# Patient Record
Sex: MALE | Race: WHITE | Employment: FULL TIME
[De-identification: names, ages, dates, MRNs, and addresses within clinical notes are randomized per-mention and may not be internally consistent; named-entity substitution may affect disease eponyms.]

---

## 2022-06-23 ENCOUNTER — NURSE TRIAGE (OUTPATIENT)
Dept: OTHER | Facility: CLINIC | Age: 47
End: 2022-06-23

## 2022-06-23 NOTE — TELEPHONE ENCOUNTER
Received call from Carolyn at [facility]; Patient with Red Flag Complaint requesting to establish care with [facility]. Subjective: Caller states \"Having abdominal pain on the left side under ribs that started 1-2 months ago. It is hard to lay on that side. For the past couple days it started to get worse and I feel full and bloated\"     Current Symptoms: Abdominal Pain, bloated, burping     Onset: 1 -2 months ago; worsening    Associated Symptoms: NA    Pain Severity: 5-6/10; throbbing; intermittent    Temperature:  Denies Fever    What has been tried: Tylenol     LMP: NA Pregnant: NA    Recommended disposition: See in Office Within 2 Weeks    Care advice provided, patient verbalizes understanding; denies any other questions or concerns; instructed to call back for any new or worsening symptoms. Patient/Caller agrees with recommended disposition; writer provided warm transfer to Oliver Cornell at [facility] for appointment scheduling    Attention Provider: Thank you for allowing me to participate in the care of your patient. The patient was connected to triage in response to information provided to the [facility]. Please do not respond through this encounter as the response is not directed to a shared pool.         Reason for Disposition   Pain is mainly in upper abdomen (if needed ask: 'is it mainly above the belly button?')   Abdominal pain is a chronic symptom (recurrent or ongoing AND lasting > 4 weeks)    Protocols used: ABDOMINAL PAIN - MALE-ADULT-OH, ABDOMINAL PAIN - UPPER-ADULT-OH

## 2022-06-24 ENCOUNTER — OFFICE VISIT (OUTPATIENT)
Dept: FAMILY MEDICINE CLINIC | Age: 47
End: 2022-06-24

## 2022-06-24 ENCOUNTER — HOSPITAL ENCOUNTER (OUTPATIENT)
Age: 47
Setting detail: SPECIMEN
Discharge: HOME | End: 2022-06-24

## 2022-06-24 VITALS
SYSTOLIC BLOOD PRESSURE: 144 MMHG | HEART RATE: 90 BPM | BODY MASS INDEX: 34.25 KG/M2 | OXYGEN SATURATION: 98 % | HEIGHT: 68 IN | TEMPERATURE: 98 F | DIASTOLIC BLOOD PRESSURE: 88 MMHG | WEIGHT: 226 LBS

## 2022-06-24 DIAGNOSIS — R10.12 LEFT UPPER QUADRANT ABDOMINAL PAIN: ICD-10-CM

## 2022-06-24 DIAGNOSIS — R10.13 EPIGASTRIC ABDOMINAL PAIN: ICD-10-CM

## 2022-06-24 DIAGNOSIS — R10.9 FLANK PAIN: ICD-10-CM

## 2022-06-24 DIAGNOSIS — R10.9 FLANK PAIN: Primary | ICD-10-CM

## 2022-06-24 LAB
ALBUMIN SERPL-MCNC: 5.1 G/DL (ref 3.5–4.6)
ALP BLD-CCNC: 53 U/L (ref 35–104)
ALT SERPL-CCNC: 51 U/L (ref 0–41)
ANION GAP SERPL CALCULATED.3IONS-SCNC: 15 MEQ/L (ref 9–15)
AST SERPL-CCNC: 27 U/L (ref 0–40)
BASOPHILS ABSOLUTE: 0.1 K/UL (ref 0–0.2)
BASOPHILS RELATIVE PERCENT: 0.7 %
BILIRUB SERPL-MCNC: 0.3 MG/DL (ref 0.2–0.7)
BUN BLDV-MCNC: 13 MG/DL (ref 6–20)
CALCIUM SERPL-MCNC: 9.8 MG/DL (ref 8.5–9.9)
CHLORIDE BLD-SCNC: 99 MEQ/L (ref 95–107)
CO2: 26 MEQ/L (ref 20–31)
CREAT SERPL-MCNC: 0.94 MG/DL (ref 0.7–1.2)
EOSINOPHILS ABSOLUTE: 0 K/UL (ref 0–0.7)
EOSINOPHILS RELATIVE PERCENT: 0.5 %
GFR AFRICAN AMERICAN: >60
GFR NON-AFRICAN AMERICAN: >60
GLOBULIN: 2.5 G/DL (ref 2.3–3.5)
GLUCOSE BLD-MCNC: 76 MG/DL (ref 70–99)
HCT VFR BLD CALC: 42.4 % (ref 42–52)
HEMOGLOBIN: 14.3 G/DL (ref 14–18)
LIPASE: 83 U/L (ref 12–95)
LYMPHOCYTES ABSOLUTE: 2 K/UL (ref 1–4.8)
LYMPHOCYTES RELATIVE PERCENT: 22.2 %
MCH RBC QN AUTO: 30 PG (ref 27–31.3)
MCHC RBC AUTO-ENTMCNC: 33.7 % (ref 33–37)
MCV RBC AUTO: 89.2 FL (ref 80–100)
MONOCYTES ABSOLUTE: 0.7 K/UL (ref 0.2–0.8)
MONOCYTES RELATIVE PERCENT: 8.2 %
NEUTROPHILS ABSOLUTE: 6 K/UL (ref 1.4–6.5)
NEUTROPHILS RELATIVE PERCENT: 68.4 %
PDW BLD-RTO: 12.9 % (ref 11.5–14.5)
PLATELET # BLD: 266 K/UL (ref 130–400)
POTASSIUM SERPL-SCNC: 4.3 MEQ/L (ref 3.4–4.9)
RBC # BLD: 4.75 M/UL (ref 4.7–6.1)
SODIUM BLD-SCNC: 140 MEQ/L (ref 135–144)
TOTAL PROTEIN: 7.6 G/DL (ref 6.3–8)
WBC # BLD: 8.8 K/UL (ref 4.8–10.8)

## 2022-06-24 PROCEDURE — 99214 OFFICE O/P EST MOD 30 MIN: CPT | Performed by: NURSE PRACTITIONER

## 2022-06-24 PROCEDURE — 81002 URINALYSIS NONAUTO W/O SCOPE: CPT | Performed by: NURSE PRACTITIONER

## 2022-06-24 PROCEDURE — 85025 COMPLETE CBC W/AUTO DIFF WBC: CPT

## 2022-06-24 PROCEDURE — 80053 COMPREHEN METABOLIC PANEL: CPT

## 2022-06-24 PROCEDURE — 83690 ASSAY OF LIPASE: CPT

## 2022-06-24 RX ORDER — ACETAMINOPHEN 325 MG/1
650 TABLET ORAL EVERY 6 HOURS PRN
COMMUNITY

## 2022-06-24 RX ORDER — ASPIRIN 325 MG
325 TABLET ORAL DAILY
COMMUNITY

## 2022-06-24 SDOH — ECONOMIC STABILITY: FOOD INSECURITY: WITHIN THE PAST 12 MONTHS, THE FOOD YOU BOUGHT JUST DIDN'T LAST AND YOU DIDN'T HAVE MONEY TO GET MORE.: NEVER TRUE

## 2022-06-24 SDOH — ECONOMIC STABILITY: FOOD INSECURITY: WITHIN THE PAST 12 MONTHS, YOU WORRIED THAT YOUR FOOD WOULD RUN OUT BEFORE YOU GOT MONEY TO BUY MORE.: NEVER TRUE

## 2022-06-24 ASSESSMENT — PATIENT HEALTH QUESTIONNAIRE - PHQ9
SUM OF ALL RESPONSES TO PHQ QUESTIONS 1-9: 0
2. FEELING DOWN, DEPRESSED OR HOPELESS: 0
SUM OF ALL RESPONSES TO PHQ9 QUESTIONS 1 & 2: 0
1. LITTLE INTEREST OR PLEASURE IN DOING THINGS: 0
SUM OF ALL RESPONSES TO PHQ QUESTIONS 1-9: 0

## 2022-06-24 ASSESSMENT — SOCIAL DETERMINANTS OF HEALTH (SDOH): HOW HARD IS IT FOR YOU TO PAY FOR THE VERY BASICS LIKE FOOD, HOUSING, MEDICAL CARE, AND HEATING?: NOT HARD AT ALL

## 2022-06-24 ASSESSMENT — ENCOUNTER SYMPTOMS
BOWEL INCONTINENCE: 0
ABDOMINAL PAIN: 1
SHORTNESS OF BREATH: 0

## 2022-06-24 NOTE — PROGRESS NOTES
[address] [facility]  [address] [place] [id number]  Dept: [phone]  Dept Fax: [phone]  Loc: [phone]     Eloisa Kinsey 51 y.o. male presents 6/24/22 with   Chief Complaint   Patient presents with   [facility]    Flank Pain     x 1 month    GI Problem     x 2 to 3 days, burping alot, diarrhea        Flank Pain  This is a new problem. The current episode started 1 to 4 weeks ago. The problem has been waxing and waning since onset. The quality of the pain is described as cramping. The symptoms are aggravated by lying down. Associated symptoms include abdominal pain. Pertinent negatives include no bladder incontinence, bowel incontinence, chest pain, dysuria, fever, numbness, paresthesias or perianal numbness. Treatments tried: tried pepto bismol. The treatment provided no relief. GI Problem  The primary symptoms include abdominal pain, nausea, vomiting and diarrhea. Primary symptoms do not include fever or dysuria. The illness does not include chills. Patient presents for lateral abdominal/flank pain, near left ribs. Pain comes and goes. Started out of nowhere around one month ago. Some pain in the epigastric area also, also having some belching and gas. Vomited small amount two days ago. Diarrhea past couple days, 3 times today, was watery. No blood in stool or vomit. Having a lot of burping. Denies heartburn. No fever/chills. States he urinates a lot at night but not during the day, denies urgency.     Reviewed the following history:    Past Medical History:   Diagnosis Date    Asthma      Past Surgical History:   Procedure Laterality Date    FACIAL COSMETIC SURGERY      LEG SURGERY      PELVIC FRACTURE SURGERY       Family History   Problem Relation Age of Onset    Cancer Mother     Diabetes Mother     Heart Disease Mother     Heart Disease Father     High Blood Pressure Father     Cancer Brother        No Known Allergies    Current Outpatient Medications on File Prior to Visit   Medication Sig Dispense Refill    acetaminophen (TYLENOL) 325 MG tablet Take 650 mg by mouth every 6 hours as needed for Pain      aspirin 325 MG tablet Take 325 mg by mouth daily       No current facility-administered medications on file prior to visit. Review of Systems   Constitutional: Negative for chills and fever. Respiratory: Negative for shortness of breath. Cardiovascular: Negative for chest pain. Gastrointestinal: Positive for abdominal pain, diarrhea, nausea and vomiting. Negative for blood in stool and bowel incontinence. Genitourinary: Positive for flank pain and frequency. Negative for bladder incontinence, dysuria, genital sores, hematuria and urgency. Neurological: Negative for numbness and paresthesias. Objective    Vitals:    06/24/22 1549   BP: (!) 144/88   Site: Right Upper Arm   Position: Sitting   Cuff Size: Medium Adult   Pulse: 90   Temp: 98 °F (36.7 °C)   TempSrc: Infrared   SpO2: 98%   Weight: 226 lb (102.5 kg)   Height: 5' 7.75\" (1.721 m)       Physical Exam  Constitutional:       General: He is not in acute distress. Appearance: Normal appearance. He is not ill-appearing or toxic-appearing. HENT:      Head: Normocephalic and atraumatic. Right Ear: Hearing and external ear normal.      Left Ear: Hearing and external ear normal.   Eyes:      General: Lids are normal.      Conjunctiva/sclera: Conjunctivae normal.   Cardiovascular:      Rate and Rhythm: Normal rate and regular rhythm. Heart sounds: Normal heart sounds. Pulmonary:      Effort: Pulmonary effort is normal. No respiratory distress. Breath sounds: Normal breath sounds. No decreased breath sounds, wheezing, rhonchi or rales. Abdominal:      General: Bowel sounds are normal.      Palpations: Abdomen is soft. Tenderness:  There is abdominal tenderness in the epigastric area and left upper quadrant. Musculoskeletal:      Cervical back: Normal range of motion and neck supple. Skin:     General: Skin is warm and dry. Neurological:      General: No focal deficit present. Mental Status: He is alert and oriented to person, place, and time. Psychiatric:         Attention and Perception: Attention normal.         Mood and Affect: Mood normal.         Speech: Speech normal.         Behavior: Behavior normal.         Thought Content: Thought content normal.         Cognition and Memory: Cognition normal.         Judgment: Judgment normal.       POC Testing Today:   Results for POC orders placed in visit on 06/24/22   POCT Urinalysis no Micro   Result Value Ref Range    Color, UA yellow     Clarity, UA clear     Glucose, UA POC -     Bilirubin, UA -     Ketones, UA -     Spec Grav, UA 1.020     Blood, UA POC -     pH, UA 6.0     Protein, UA POC -     Urobilinogen, UA -     Leukocytes, UA -     Nitrite, UA -        Assessment and Plan    Seamus Zurita 51 y.o. male presenting for flank pain     1. Flank pain  - POCT Urinalysis no Micro  - Comprehensive Metabolic Panel; Future  - CBC with Auto Differential; Future  - Lipase; Future  - XR CHEST STANDARD (2 VW); Future  - US ABDOMEN COMPLETE; Future    2. Epigastric abdominal pain  - Comprehensive Metabolic Panel; Future  - CBC with Auto Differential; Future  - Lipase; Future  - US ABDOMEN COMPLETE; Future    3. Left upper quadrant abdominal pain  - Comprehensive Metabolic Panel; Future  - CBC with Auto Differential; Future  - Lipase; Future  - US ABDOMEN COMPLETE; Future      Procedures:  Unless otherwise noted below, none    Return if symptoms worsen or fail to improve, for follow up. Side effects and adverse effects of any medication prescribed today, as well as treatment plan/rationale, follow-up care, and result expectations have been discussed with the patient.   Expresses understanding and desires to proceed with treatment plan. The patient was reminded that if an antibiotic has been prescribed the predicted course is improvement to cure with no persistent issues. Take antibiotics as directed. If any problems occur, an appointment should be made or ER visit if severe. Because of the risk with ANY antibiotic of C. Difficile colitis if persistent diarrhea or abdominal pain or any concerning symptoms, we should be notified. To reduce this risk, a probiotic pill, yogurt or other preparations containing active cultures should be ingested daily -particularly while on the antibiotic. If any persistent symptoms of illness, follow up appointment should be made in a timely fashion with a physician. Discussed signs and symptoms which require immediate follow-up in ED/call to 911. Understanding verbalized. I have reviewed and updated the electronic medical record.     HENRY Tolliver - CNP

## 2022-06-27 ENCOUNTER — TELEPHONE (OUTPATIENT)
Dept: FAMILY MEDICINE CLINIC | Age: 47
End: 2022-06-27

## 2022-06-27 LAB
BILIRUBIN, POC: NORMAL
BLOOD URINE, POC: NORMAL
CLARITY, POC: CLEAR
COLOR, POC: YELLOW
GLUCOSE URINE, POC: NORMAL
KETONES, POC: NORMAL
LEUKOCYTE EST, POC: NORMAL
NITRITE, POC: NORMAL
PH, POC: 6
PROTEIN, POC: NORMAL
SPECIFIC GRAVITY, POC: 1.02
UROBILINOGEN, POC: NORMAL

## 2022-07-06 ASSESSMENT — ENCOUNTER SYMPTOMS
BLOOD IN STOOL: 0
NAUSEA: 1
VOMITING: 1
DIARRHEA: 1

## 2024-08-06 ENCOUNTER — APPOINTMENT (OUTPATIENT)
Dept: PRIMARY CARE | Facility: CLINIC | Age: 49
End: 2024-08-06

## 2024-08-06 VITALS
DIASTOLIC BLOOD PRESSURE: 80 MMHG | SYSTOLIC BLOOD PRESSURE: 140 MMHG | OXYGEN SATURATION: 98 % | WEIGHT: 228.6 LBS | HEIGHT: 68 IN | BODY MASS INDEX: 34.65 KG/M2 | TEMPERATURE: 97.1 F | HEART RATE: 84 BPM

## 2024-08-06 DIAGNOSIS — E78.2 MIXED HYPERLIPIDEMIA: ICD-10-CM

## 2024-08-06 DIAGNOSIS — Z12.11 SCREENING FOR COLON CANCER: ICD-10-CM

## 2024-08-06 DIAGNOSIS — N52.8 OTHER MALE ERECTILE DYSFUNCTION: ICD-10-CM

## 2024-08-06 DIAGNOSIS — R52 PAIN: ICD-10-CM

## 2024-08-06 DIAGNOSIS — N40.0 BENIGN PROSTATIC HYPERPLASIA WITHOUT LOWER URINARY TRACT SYMPTOMS: ICD-10-CM

## 2024-08-06 DIAGNOSIS — J45.20 MILD INTERMITTENT ASTHMA WITHOUT COMPLICATION (HHS-HCC): ICD-10-CM

## 2024-08-06 DIAGNOSIS — Z76.89 ENCOUNTER TO ESTABLISH CARE: Primary | ICD-10-CM

## 2024-08-06 DIAGNOSIS — N40.0 BENIGN PROSTATIC HYPERPLASIA, UNSPECIFIED WHETHER LOWER URINARY TRACT SYMPTOMS PRESENT: ICD-10-CM

## 2024-08-06 DIAGNOSIS — E66.09 CLASS 1 OBESITY DUE TO EXCESS CALORIES WITH SERIOUS COMORBIDITY AND BODY MASS INDEX (BMI) OF 34.0 TO 34.9 IN ADULT: ICD-10-CM

## 2024-08-06 DIAGNOSIS — Z00.00 HEALTHCARE MAINTENANCE: ICD-10-CM

## 2024-08-06 PROCEDURE — 99203 OFFICE O/P NEW LOW 30 MIN: CPT | Performed by: FAMILY MEDICINE

## 2024-08-06 PROCEDURE — 3008F BODY MASS INDEX DOCD: CPT | Performed by: FAMILY MEDICINE

## 2024-08-06 RX ORDER — SILDENAFIL 100 MG/1
100 TABLET, FILM COATED ORAL DAILY PRN
Qty: 30 TABLET | Refills: 3 | Status: SHIPPED | OUTPATIENT
Start: 2024-08-06

## 2024-08-06 RX ORDER — GABAPENTIN 300 MG/1
300 CAPSULE ORAL 2 TIMES DAILY
Qty: 60 CAPSULE | Refills: 0 | Status: SHIPPED | OUTPATIENT
Start: 2024-08-06 | End: 2024-09-05

## 2024-08-06 RX ORDER — PREDNISONE 10 MG/1
TABLET ORAL
Qty: 51 TABLET | Refills: 0 | Status: SHIPPED | OUTPATIENT
Start: 2024-08-06

## 2024-08-06 ASSESSMENT — PATIENT HEALTH QUESTIONNAIRE - PHQ9
2. FEELING DOWN, DEPRESSED OR HOPELESS: NOT AT ALL
SUM OF ALL RESPONSES TO PHQ9 QUESTIONS 1 AND 2: 0
1. LITTLE INTEREST OR PLEASURE IN DOING THINGS: NOT AT ALL
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
## 2024-08-19 ENCOUNTER — LAB (OUTPATIENT)
Dept: LAB | Facility: LAB | Age: 49
End: 2024-08-19
Payer: COMMERCIAL

## 2024-08-19 DIAGNOSIS — N52.8 OTHER MALE ERECTILE DYSFUNCTION: ICD-10-CM

## 2024-08-19 DIAGNOSIS — Z00.00 HEALTHCARE MAINTENANCE: ICD-10-CM

## 2024-08-19 DIAGNOSIS — N40.0 BENIGN PROSTATIC HYPERPLASIA WITHOUT LOWER URINARY TRACT SYMPTOMS: ICD-10-CM

## 2024-08-19 DIAGNOSIS — E78.2 MIXED HYPERLIPIDEMIA: ICD-10-CM

## 2024-08-19 LAB
ALBUMIN SERPL BCP-MCNC: 4.4 G/DL (ref 3.4–5)
ALP SERPL-CCNC: 47 U/L (ref 33–120)
ALT SERPL W P-5'-P-CCNC: 27 U/L (ref 10–52)
ANION GAP SERPL CALC-SCNC: 11 MMOL/L (ref 10–20)
AST SERPL W P-5'-P-CCNC: 13 U/L (ref 9–39)
BASOPHILS # BLD AUTO: 0.02 X10*3/UL (ref 0–0.1)
BASOPHILS NFR BLD AUTO: 0.2 %
BILIRUB SERPL-MCNC: 0.5 MG/DL (ref 0–1.2)
BUN SERPL-MCNC: 13 MG/DL (ref 6–23)
CALCIUM SERPL-MCNC: 9.2 MG/DL (ref 8.6–10.3)
CHLORIDE SERPL-SCNC: 102 MMOL/L (ref 98–107)
CHOLEST SERPL-MCNC: 242 MG/DL (ref 0–199)
CHOLESTEROL/HDL RATIO: 4.7
CO2 SERPL-SCNC: 30 MMOL/L (ref 21–32)
CREAT SERPL-MCNC: 0.75 MG/DL (ref 0.5–1.3)
EGFRCR SERPLBLD CKD-EPI 2021: >90 ML/MIN/1.73M*2
EOSINOPHIL # BLD AUTO: 0.15 X10*3/UL (ref 0–0.7)
EOSINOPHIL NFR BLD AUTO: 1.2 %
ERYTHROCYTE [DISTWIDTH] IN BLOOD BY AUTOMATED COUNT: 12.3 % (ref 11.5–14.5)
GLUCOSE SERPL-MCNC: 89 MG/DL (ref 74–99)
HCT VFR BLD AUTO: 45.7 % (ref 41–52)
HDLC SERPL-MCNC: 51.3 MG/DL
HGB BLD-MCNC: 15.2 G/DL (ref 13.5–17.5)
IMM GRANULOCYTES # BLD AUTO: 0.09 X10*3/UL (ref 0–0.7)
IMM GRANULOCYTES NFR BLD AUTO: 0.7 % (ref 0–0.9)
LDLC SERPL CALC-MCNC: 140 MG/DL
LYMPHOCYTES # BLD AUTO: 1.89 X10*3/UL (ref 1.2–4.8)
LYMPHOCYTES NFR BLD AUTO: 15.2 %
MCH RBC QN AUTO: 30.3 PG (ref 26–34)
MCHC RBC AUTO-ENTMCNC: 33.3 G/DL (ref 32–36)
MCV RBC AUTO: 91 FL (ref 80–100)
MONOCYTES # BLD AUTO: 0.75 X10*3/UL (ref 0.1–1)
MONOCYTES NFR BLD AUTO: 6 %
NEUTROPHILS # BLD AUTO: 9.51 X10*3/UL (ref 1.2–7.7)
NEUTROPHILS NFR BLD AUTO: 76.7 %
NON HDL CHOLESTEROL: 191 MG/DL (ref 0–149)
NRBC BLD-RTO: 0 /100 WBCS (ref 0–0)
PLATELET # BLD AUTO: 248 X10*3/UL (ref 150–450)
POTASSIUM SERPL-SCNC: 5.2 MMOL/L (ref 3.5–5.3)
PROT SERPL-MCNC: 6.9 G/DL (ref 6.4–8.2)
PSA SERPL-MCNC: 0.91 NG/ML
RBC # BLD AUTO: 5.02 X10*6/UL (ref 4.5–5.9)
SODIUM SERPL-SCNC: 138 MMOL/L (ref 136–145)
TRIGL SERPL-MCNC: 253 MG/DL (ref 0–149)
VLDL: 51 MG/DL (ref 0–40)
WBC # BLD AUTO: 12.4 X10*3/UL (ref 4.4–11.3)

## 2024-08-19 PROCEDURE — 80053 COMPREHEN METABOLIC PANEL: CPT

## 2024-08-19 PROCEDURE — 85025 COMPLETE CBC W/AUTO DIFF WBC: CPT

## 2024-08-19 PROCEDURE — 36415 COLL VENOUS BLD VENIPUNCTURE: CPT

## 2024-08-19 PROCEDURE — 84403 ASSAY OF TOTAL TESTOSTERONE: CPT

## 2024-08-19 PROCEDURE — 84153 ASSAY OF PSA TOTAL: CPT

## 2024-08-19 PROCEDURE — 80061 LIPID PANEL: CPT

## 2024-08-20 DIAGNOSIS — E78.2 MIXED HYPERLIPIDEMIA: ICD-10-CM

## 2024-08-20 LAB — TESTOST SERPL-MCNC: 287 NG/DL (ref 240–1000)

## 2024-08-20 RX ORDER — ROSUVASTATIN CALCIUM 20 MG/1
20 TABLET, COATED ORAL DAILY
Qty: 30 TABLET | Refills: 3 | Status: SHIPPED | OUTPATIENT
Start: 2024-08-20 | End: 2025-09-24

## 2024-08-20 NOTE — TELEPHONE ENCOUNTER
----- Message from Sunil Garner sent at 8/20/2024  7:39 AM EDT -----  Labs are normal except for elevated cholesterol persistent.  Recommend he start on 20 mg of generic Crestor and check a fasting lipid panel in 4 months

## 2024-08-22 ENCOUNTER — APPOINTMENT (OUTPATIENT)
Dept: PRIMARY CARE | Facility: CLINIC | Age: 49
End: 2024-08-22
Payer: COMMERCIAL

## 2024-08-22 VITALS
HEART RATE: 91 BPM | SYSTOLIC BLOOD PRESSURE: 132 MMHG | TEMPERATURE: 97.3 F | DIASTOLIC BLOOD PRESSURE: 80 MMHG | OXYGEN SATURATION: 97 % | HEIGHT: 68 IN | RESPIRATION RATE: 16 BRPM | BODY MASS INDEX: 35.37 KG/M2 | WEIGHT: 233.4 LBS

## 2024-08-22 DIAGNOSIS — N52.8 OTHER MALE ERECTILE DYSFUNCTION: ICD-10-CM

## 2024-08-22 DIAGNOSIS — M54.40 ACUTE RIGHT-SIDED LOW BACK PAIN WITH SCIATICA, SCIATICA LATERALITY UNSPECIFIED: Primary | ICD-10-CM

## 2024-08-22 DIAGNOSIS — E66.9 OBESITY (BMI 35.0-39.9 WITHOUT COMORBIDITY): ICD-10-CM

## 2024-08-22 DIAGNOSIS — R52 PAIN: ICD-10-CM

## 2024-08-22 PROBLEM — R53.83 OTHER FATIGUE: Status: ACTIVE | Noted: 2019-01-15

## 2024-08-22 PROCEDURE — 3008F BODY MASS INDEX DOCD: CPT | Performed by: FAMILY MEDICINE

## 2024-08-22 PROCEDURE — 99213 OFFICE O/P EST LOW 20 MIN: CPT | Performed by: FAMILY MEDICINE

## 2024-08-22 RX ORDER — TADALAFIL 20 MG/1
20 TABLET ORAL DAILY PRN
Qty: 12 TABLET | Refills: 3 | Status: SHIPPED | OUTPATIENT
Start: 2024-08-22 | End: 2025-08-22

## 2024-08-22 RX ORDER — PREDNISONE 10 MG/1
TABLET ORAL
Qty: 51 TABLET | Refills: 0 | Status: SHIPPED | OUTPATIENT
Start: 2024-08-22

## 2024-08-22 RX ORDER — CYCLOBENZAPRINE HCL 5 MG
5 TABLET ORAL 3 TIMES DAILY PRN
Qty: 30 TABLET | Refills: 2 | Status: SHIPPED | OUTPATIENT
Start: 2024-08-22 | End: 2025-04-19

## 2024-08-22 RX ORDER — OXYCODONE AND ACETAMINOPHEN 5; 325 MG/1; MG/1
1 TABLET ORAL EVERY 8 HOURS PRN
Qty: 21 TABLET | Refills: 0 | Status: SHIPPED | OUTPATIENT
Start: 2024-08-22 | End: 2024-08-29

## 2024-08-22 NOTE — PROGRESS NOTES
Subjective   Patient ID: Yan Monroy is a 49 y.o. male who presents for No chief complaint on file..  HPI  pt is being seen for lower back pain and right hip  ongoing for 1 week  Pt states that his back give out while in bed  Pt reate pain at 8/10   pt is taking gabapentin not work    No other concern /question     Review of Systems  Constitutional:  no chills, no fever and no night sweats.  Eyes: no blurred vision and no eyesight problems.  ENT: no hearing loss, no nasal congestion, no hoarseness and no sore throat.  Neck: no mass (es) and no swelling.  Cardiovascular: no chest pain, no intermittent leg claudication, no lower extremity edema, no palpitation and no syncope.  Respiratory: no cough, no shortness of breath during exertion, no shortness of breath at rest and no wheezing.  Gastrointestinal: no abdominal pain, no blood in stools, no constipation, no diarrhea, no melena, no nausea, no rectal pain and no vomiting.  Genitourinary: no dysuria, no change in urinary frequency, no urinary hesitancy and no feelings of urinary urgency.  Musculoskeletal: no arthralgias, no back pain and no myalgias.  Integumentary: no new skin lesions and no rashes.  Neurological: no difficulty walking, no headache, no limb weakness, no numbness and no tingling.  Psychiatric/Behavioral: no anxiety, no depression, no anhedonia and no substance use disorders.  Endocrine: no recent weight gain and no recent weight loss.  Hematologic/Lymphatic: no tendency for easy bruising and no swollen glands    Objective   Physical Exam  Patient intermittent chronic back pain.  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  Exam he is pain and tenderness in the lumbar spine over  the L3-L4 vertebral body with radiation of pain  There were no vitals taken for this visit.  Lumbar musculature on the right side some pain down into the right buttock and posterior thigh he has pain in the back with extension of the right leg.  But no paresthesias no pain with raising of the left leg    Lab Results   Component Value Date    WBC 12.4 (H) 08/19/2024    HGB 15.2 08/19/2024    HCT 45.7 08/19/2024    MCV 91 08/19/2024     08/19/2024       Assessment/Plan assessments acute back injury muscle spasm strain plan burst and taper prednisone Flexeril 5 3 times daily and oxycodone 5 mg every 8 hours as needed pain he will do gentle stretches heat ice once he is feeling better we will get him into do physical therapy to work on strengthening to try to minimize exacerbations.  If he is worsening or new symptoms develop over the weekend he is to go to the emergency room  Problem List Items Addressed This Visit    None

## 2024-08-27 ENCOUNTER — ANESTHESIA EVENT (OUTPATIENT)
Dept: GASTROENTEROLOGY | Facility: EXTERNAL LOCATION | Age: 49
End: 2024-08-27

## 2024-09-09 ENCOUNTER — APPOINTMENT (OUTPATIENT)
Dept: GASTROENTEROLOGY | Facility: EXTERNAL LOCATION | Age: 49
End: 2024-09-09
Payer: COMMERCIAL

## 2024-09-09 ENCOUNTER — ANESTHESIA (OUTPATIENT)
Dept: GASTROENTEROLOGY | Facility: EXTERNAL LOCATION | Age: 49
End: 2024-09-09

## 2024-09-09 VITALS
WEIGHT: 225 LBS | SYSTOLIC BLOOD PRESSURE: 127 MMHG | OXYGEN SATURATION: 95 % | RESPIRATION RATE: 15 BRPM | HEART RATE: 95 BPM | DIASTOLIC BLOOD PRESSURE: 65 MMHG | BODY MASS INDEX: 34.1 KG/M2 | HEIGHT: 68 IN | TEMPERATURE: 97.7 F

## 2024-09-09 DIAGNOSIS — Z12.11 SCREENING FOR COLON CANCER: Primary | ICD-10-CM

## 2024-09-09 PROBLEM — G89.29 CHRONIC PAIN: Status: ACTIVE | Noted: 2024-09-09

## 2024-09-09 PROBLEM — F12.90 MARIJUANA SMOKER: Status: ACTIVE | Noted: 2024-09-09

## 2024-09-09 PROBLEM — E78.5 HYPERLIPIDEMIA: Status: ACTIVE | Noted: 2024-09-09

## 2024-09-09 PROCEDURE — 0753T DGTZ GLS MCRSCP SLD LEVEL IV: CPT | Mod: TC,ELYLAB | Performed by: INTERNAL MEDICINE

## 2024-09-09 PROCEDURE — 88305 TISSUE EXAM BY PATHOLOGIST: CPT | Performed by: PATHOLOGY

## 2024-09-09 PROCEDURE — 45385 COLONOSCOPY W/LESION REMOVAL: CPT | Performed by: INTERNAL MEDICINE

## 2024-09-09 PROCEDURE — 45380 COLONOSCOPY AND BIOPSY: CPT | Performed by: INTERNAL MEDICINE

## 2024-09-09 RX ORDER — OXYCODONE AND ACETAMINOPHEN 5; 325 MG/1; MG/1
1 TABLET ORAL EVERY 4 HOURS PRN
COMMUNITY

## 2024-09-09 RX ORDER — PROPOFOL 10 MG/ML
INJECTION, EMULSION INTRAVENOUS AS NEEDED
Status: DISCONTINUED | OUTPATIENT
Start: 2024-09-09 | End: 2024-09-09

## 2024-09-09 RX ORDER — SODIUM CHLORIDE 9 MG/ML
20 INJECTION, SOLUTION INTRAVENOUS CONTINUOUS
Status: DISCONTINUED | OUTPATIENT
Start: 2024-09-09 | End: 2024-09-10 | Stop reason: HOSPADM

## 2024-09-09 RX ORDER — LIDOCAINE HYDROCHLORIDE 20 MG/ML
INJECTION, SOLUTION INFILTRATION; PERINEURAL AS NEEDED
Status: DISCONTINUED | OUTPATIENT
Start: 2024-09-09 | End: 2024-09-09

## 2024-09-09 SDOH — HEALTH STABILITY: MENTAL HEALTH: CURRENT SMOKER: 0

## 2024-09-09 ASSESSMENT — COLUMBIA-SUICIDE SEVERITY RATING SCALE - C-SSRS
2. HAVE YOU ACTUALLY HAD ANY THOUGHTS OF KILLING YOURSELF?: NO
1. IN THE PAST MONTH, HAVE YOU WISHED YOU WERE DEAD OR WISHED YOU COULD GO TO SLEEP AND NOT WAKE UP?: NO
6. HAVE YOU EVER DONE ANYTHING, STARTED TO DO ANYTHING, OR PREPARED TO DO ANYTHING TO END YOUR LIFE?: NO

## 2024-09-09 ASSESSMENT — PAIN SCALES - GENERAL
PAINLEVEL_OUTOF10: 0 - NO PAIN
PAIN_LEVEL: 0

## 2024-09-09 ASSESSMENT — PAIN - FUNCTIONAL ASSESSMENT
PAIN_FUNCTIONAL_ASSESSMENT: 0-10

## 2024-09-09 NOTE — DISCHARGE INSTRUCTIONS
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
## 2024-09-09 NOTE — ANESTHESIA POSTPROCEDURE EVALUATION
Patient: Yan Monroy    Procedure Summary       Date: 09/09/24 Room / Location: [facility]    Anesthesia Start: 1149 Anesthesia Stop:     Procedure: COLONOSCOPY Diagnosis:       Screening for colon cancer      Screening for colon cancer    Scheduled Providers: Aquiles Malone MD; MONSE Redd Responsible Provider: MONSE Redd    Anesthesia Type: MAC ASA Status: 2            Anesthesia Type: MAC    Vitals Value Taken Time   /68 09/09/24 1213   Temp 36.5 09/09/24 1213   Pulse 82 09/09/24 1213   Resp 16 09/09/24 1213   SpO2 95 09/09/24 1213       Anesthesia Post Evaluation    Patient location during evaluation: bedside  Patient participation: waiting for patient participation  Level of consciousness: awake  Pain score: 0  Pain management: adequate  Airway patency: patent  Cardiovascular status: acceptable  Respiratory status: acceptable and room air  Hydration status: acceptable  Postoperative Nausea and Vomiting: none      No notable events documented.

## 2024-09-09 NOTE — H&P
Outpatient NR Procedure H&P    Patient Profile  Name Yan Monroy  Date of Birth 1975  MRN [id number]  PCP Sunil Garner        Diagnosis: screening colon  Procedure(s):   Colonoscopy       Allergies  No Known Allergies    Past Medical History   Past Medical History:   Diagnosis Date    Benign bone tumor     Hyperlipidemia        Medication Reviewed - yes  Prior to Admission medications    Medication Sig Start Date End Date Taking? Authorizing Provider   cyclobenzaprine (Flexeril) 5 mg tablet Take 1 tablet (5 mg) by mouth 3 times a day as needed for muscle spasms. 8/22/24 4/19/25 Yes Sunil Garner MD   gabapentin (Neurontin) 300 mg capsule Take 1 capsule (300 mg) by mouth 2 times a day. 8/6/24 9/9/24 Yes Sunil Garner MD   oxyCODONE-acetaminophen (Percocet) 5-325 mg tablet Take 1 tablet by mouth every 4 hours if needed for severe pain (7 - 10).   Yes Historical Provider, MD   rosuvastatin (Crestor) 20 mg tablet Take 1 tablet (20 mg) by mouth once daily. 8/20/24 9/24/25 Yes Sunil Garner MD   tadalafil (Cialis) 20 mg tablet Take 1 tablet (20 mg) by mouth once daily as needed for erectile dysfunction. 8/22/24 8/22/25 Yes Sunil Garner MD   predniSONE (Deltasone) 10 mg tablet Take 60 mg a day for 6 days.  Then decrease by 10 mg a day until gone 8/22/24   Sunil Garner MD   sildenafil (Viagra) 100 mg tablet Take 1 tablet (100 mg) by mouth once daily as needed for erectile dysfunction.  Patient not taking: Reported on 8/22/2024 8/6/24   Sunil Garner MD       Physical Exam  Vitals:    09/09/24 1115   BP: 127/82   Pulse: 81   Resp: 13   Temp: 36.8 °C (98.2 °F)   SpO2: 98%      Weight   Vitals:    09/09/24 1115   Weight: 102 kg (225 lb)     BMI Body mass index is 34.21 kg/m².    General: A&Ox3, NAD.  HEENT: AT/NC.   CV: RRR. No murmur.  Resp: CTA bilaterally. No wheezing, rhonchi or rales.   GI: Soft, NT/ND. BSx4.  Extrem: No edema. Pulses intact.  Skin: No Jaundice.   Neuro:  No focal deficits.   Psych: Normal mood and affect.        Oropharyngeal Classification I (soft palate, uvula, fauces, and tonsillar pillars visible)  ASA PS Classification 1  Sedation Plan: Deep Sedation.  Procedure Plan - pre-procedural (re)assesment completed by physician:  discharge/transfer patient when discharge criteria met    Aquiles Malone MD  9/9/2024 11:35 AM

## 2024-09-11 DIAGNOSIS — G89.29 OTHER CHRONIC PAIN: ICD-10-CM

## 2024-09-11 NOTE — TELEPHONE ENCOUNTER
Rx Controlled Refill Request Telephone Encounter    Name: Yan Monroy  :  1975    oxyCODONE-acetaminophen (Percocet) 5-325 mg tablet [[id number]]      Order Details  Dose: 1 tablet Route: oral Frequency: Every 8 hours PRN for severe pain (7 - 10)   Dispense Quantity: 21 tablet Refills: 0          Sig: Take 1 tablet by mouth every 8 hours if needed for severe pain (7 - 10) for up to 7 days.     ALLERGIES:    NKDA    LAST DRUG SCREEN:       LAST MED CONTRACT:      Specific Pharmacy location:   [facility]     Date of last appointment:    2024  Date of next appointment:    2024    Best number to reach patient:    [phone]

## 2024-09-12 RX ORDER — OXYCODONE AND ACETAMINOPHEN 5; 325 MG/1; MG/1
1 TABLET ORAL EVERY 4 HOURS PRN
Qty: 180 TABLET | Refills: 0 | Status: CANCELLED | OUTPATIENT
Start: 2024-09-12

## 2024-09-12 RX ORDER — OXYCODONE AND ACETAMINOPHEN 5; 325 MG/1; MG/1
1 TABLET ORAL EVERY 4 HOURS PRN
Qty: 72 TABLET | Refills: 0 | OUTPATIENT
Start: 2024-09-12

## 2024-09-12 NOTE — TELEPHONE ENCOUNTER
Needs an appointment with the doctor those were acute medications he should not be taking every day all the time, so we need to reevaluate this with Dr. Garner

## 2024-09-19 LAB
LABORATORY COMMENT REPORT: NORMAL
PATH REPORT.FINAL DX SPEC: NORMAL
PATH REPORT.GROSS SPEC: NORMAL
PATH REPORT.RELEVANT HX SPEC: NORMAL
PATH REPORT.TOTAL CANCER: NORMAL

## 2024-09-23 ENCOUNTER — TELEPHONE (OUTPATIENT)
Dept: GASTROENTEROLOGY | Facility: EXTERNAL LOCATION | Age: 49
End: 2024-09-23
Payer: COMMERCIAL

## 2024-09-24 ENCOUNTER — APPOINTMENT (OUTPATIENT)
Dept: PRIMARY CARE | Facility: CLINIC | Age: 49
End: 2024-09-24
Payer: COMMERCIAL

## 2024-09-24 VITALS
SYSTOLIC BLOOD PRESSURE: 124 MMHG | WEIGHT: 230 LBS | HEIGHT: 68 IN | BODY MASS INDEX: 34.86 KG/M2 | HEART RATE: 96 BPM | DIASTOLIC BLOOD PRESSURE: 84 MMHG | OXYGEN SATURATION: 98 %

## 2024-09-24 DIAGNOSIS — M54.50 ACUTE BILATERAL LOW BACK PAIN WITHOUT SCIATICA: Primary | ICD-10-CM

## 2024-09-24 DIAGNOSIS — R52 PAIN: ICD-10-CM

## 2024-09-24 PROCEDURE — 3008F BODY MASS INDEX DOCD: CPT | Performed by: FAMILY MEDICINE

## 2024-09-24 PROCEDURE — 99213 OFFICE O/P EST LOW 20 MIN: CPT | Performed by: FAMILY MEDICINE

## 2024-09-24 RX ORDER — OXYCODONE AND ACETAMINOPHEN 5; 325 MG/1; MG/1
1 TABLET ORAL EVERY 4 HOURS PRN
Qty: 21 TABLET | Refills: 0 | Status: SHIPPED | OUTPATIENT
Start: 2024-09-24

## 2024-09-24 RX ORDER — PREDNISONE 10 MG/1
TABLET ORAL
Qty: 51 TABLET | Refills: 0 | Status: SHIPPED | OUTPATIENT
Start: 2024-09-24

## 2024-09-24 ASSESSMENT — PATIENT HEALTH QUESTIONNAIRE - PHQ9
2. FEELING DOWN, DEPRESSED OR HOPELESS: NOT AT ALL
1. LITTLE INTEREST OR PLEASURE IN DOING THINGS: NOT AT ALL
SUM OF ALL RESPONSES TO PHQ9 QUESTIONS 1 AND 2: 0

## 2024-09-24 NOTE — PROGRESS NOTES
Subjective   Patient ID: Yan Monroy is a 49 y.o. male who presents for Back Pain.  HPI  Lower back pain, says it is getting worse  Ongoing for years  Tried tylenol   Review of Systems  Constitutional:  no chills, no fever and no night sweats.  Eyes: no blurred vision and no eyesight problems.  ENT: no hearing loss, no nasal congestion, no hoarseness and no sore throat.  Neck: no mass (es) and no swelling.  Cardiovascular: no chest pain, no intermittent leg claudication, no lower extremity edema, no palpitation and no syncope.  Respiratory: no cough, no shortness of breath during exertion, no shortness of breath at rest and no wheezing.  Gastrointestinal: no abdominal pain, no blood in stools, no constipation, no diarrhea, no melena, no nausea, no rectal pain and no vomiting.  Genitourinary: no dysuria, no change in urinary frequency, no urinary hesitancy and no feelings of urinary urgency.  Musculoskeletal: no arthralgias, no back pain and no myalgias.  Integumentary: no new skin lesions and no rashes.  Neurological: no difficulty walking, no headache, no limb weakness, no numbness and no tingling.  Psychiatric/Behavioral: no anxiety, no depression, no anhedonia and no substance use disorders.  Endocrine: no recent weight gain and no recent weight loss.  Hematologic/Lymphatic: no tendency for easy bruising and no swollen glands    Objective   Physical Exam  Patient in with his wife for follow-up of low back pain minimal improvement with the steroids still has chronic pain had to miss 5 days of work intermittent paresthesias to the buttock and to the upper posterior thighs bilaterally.  No trauma restricted range of motion flexion extension increases pain.  There were no vitals taken for this visit.    Lab Results   Component Value Date    WBC 12.4 (H) 08/19/2024    HGB 15.2 08/19/2024    HCT 45.7 08/19/2024    MCV 91 08/19/2024     08/19/2024       Assessment/Plan plan is to get him approved for an MRI  repeat steroid burst and taper refill the oxycodone if he suddenly worsening he is to let us know.  Problem List Items Addressed This Visit    None

## 2024-10-10 ENCOUNTER — HOSPITAL ENCOUNTER (OUTPATIENT)
Dept: RADIOLOGY | Facility: HOSPITAL | Age: 49
Discharge: HOME | End: 2024-10-10
Payer: COMMERCIAL

## 2024-10-10 DIAGNOSIS — M54.50 ACUTE BILATERAL LOW BACK PAIN WITHOUT SCIATICA: ICD-10-CM

## 2024-10-10 PROCEDURE — 72148 MRI LUMBAR SPINE W/O DYE: CPT

## 2024-10-11 ENCOUNTER — TELEPHONE (OUTPATIENT)
Dept: PRIMARY CARE | Facility: CLINIC | Age: 49
End: 2024-10-11
Payer: COMMERCIAL

## 2024-10-11 DIAGNOSIS — M54.50 ACUTE BILATERAL LOW BACK PAIN WITHOUT SCIATICA: ICD-10-CM

## 2024-10-11 NOTE — TELEPHONE ENCOUNTER
----- Message from Sunil Garner sent at 10/11/2024  9:53 AM EDT -----  Refer patient to Dr. Barron

## 2024-11-11 DIAGNOSIS — M54.50 ACUTE BILATERAL LOW BACK PAIN WITHOUT SCIATICA: ICD-10-CM

## 2024-11-11 RX ORDER — OXYCODONE AND ACETAMINOPHEN 5; 325 MG/1; MG/1
1 TABLET ORAL EVERY 4 HOURS PRN
Qty: 21 TABLET | Refills: 0 | Status: SHIPPED | OUTPATIENT
Start: 2024-11-11 | End: 2024-11-15 | Stop reason: SDUPTHER

## 2024-11-11 NOTE — TELEPHONE ENCOUNTER
Rx Controlled Refill Request Telephone Encounter    Name: Yan Monroy  :  1975    Medication Name:   PERCOCET  Dose (Optional):   5 - 325 MG  Quantity (Optional):   21   Directions (Optional):   1 TABLET EVERY 4 HOURS PRN FOR PAIN    ALLERGIES:    ON FILE    LAST DRUG SCREEN:       LAST MED CONTRACT:        Specific [facility] [place]    Date of last appointment:    24  Date of next appointment:        Best number to reach patient:    [phone]    PATIENT NEEDS SHORT SUPPLY TO GET HIM THROUGH UNTIL APPOINTMENT WITH DR. STONE ON 11/15/24

## 2024-11-15 ENCOUNTER — HOSPITAL ENCOUNTER (OUTPATIENT)
Dept: RADIOLOGY | Facility: CLINIC | Age: 49
Discharge: HOME | End: 2024-11-15
Payer: COMMERCIAL

## 2024-11-15 ENCOUNTER — OFFICE VISIT (OUTPATIENT)
Dept: ORTHOPEDIC SURGERY | Facility: CLINIC | Age: 49
End: 2024-11-15
Payer: COMMERCIAL

## 2024-11-15 VITALS — HEIGHT: 68 IN | BODY MASS INDEX: 34.86 KG/M2 | WEIGHT: 230 LBS

## 2024-11-15 DIAGNOSIS — M54.50 ACUTE BILATERAL LOW BACK PAIN WITHOUT SCIATICA: ICD-10-CM

## 2024-11-15 PROCEDURE — 99215 OFFICE O/P EST HI 40 MIN: CPT | Performed by: ORTHOPAEDIC SURGERY

## 2024-11-15 PROCEDURE — 72120 X-RAY BEND ONLY L-S SPINE: CPT

## 2024-11-15 RX ORDER — OXYCODONE AND ACETAMINOPHEN 5; 325 MG/1; MG/1
1 TABLET ORAL EVERY 4 HOURS PRN
Qty: 18 TABLET | Refills: 0 | Status: SHIPPED | OUTPATIENT
Start: 2024-11-15

## 2024-11-15 ASSESSMENT — PAIN SCALES - GENERAL: PAINLEVEL_OUTOF10: 6

## 2024-11-15 ASSESSMENT — PAIN - FUNCTIONAL ASSESSMENT: PAIN_FUNCTIONAL_ASSESSMENT: 0-10

## 2024-11-15 NOTE — TELEPHONE ENCOUNTER
Pt also has no contract or urine done for this med refuse. Please contact pt to set up a UDS and contract on MA schedule.

## 2024-11-15 NOTE — TELEPHONE ENCOUNTER
Rx Controlled Refill Request Telephone Encounter    Name: Yan Monroy  :  1975    Medication Name:   OXYCODONE/PERCOCET  Dose (Optional):   5 - 325 MG  Quantity (Optional):   21  Directions (Optional):   1 TABLET EVERY 4 HOURS PRN FOR PAIN    ALLERGIES:    ON FILE    LAST DRUG SCREEN/MED CONTRACT:         Specific [facility] location[facility] [place]    Date of last appointment:    24  Date of next appointment:    NONE    Best number to reach patient:    [phone]    PATIENT IS CALLING - HE SAW DR. STONE TODAY - REPORTS THAT HE HAS TO GET A HIP REPLACEMENT REQUESTING MORE PAIN MEDICATION?      LAST REFILL WE RECEIVED: 24

## 2024-11-15 NOTE — PROGRESS NOTES
Chief complaint right hip thigh groin and back pain    HPI: Patient with a 2-year history of back pain and also groin and thigh pain and hip pain on the right side.  Nothing on the left side.  Activity makes his pain worse.  Rest makes it better.  He has had episodes where his pain would come and go but lately has been much more consistent and much more severe.  He is now severely inhibited with bodily function.  He has had no formal treatment for this at all.  He has no history of spine surgery.  He had a right hip pinning in the past and has had a femoral nail down the left femur in the past.  He has not done physical therapy.  No chiropractic.  No injections.  No fevers chills nausea vomiting.  No bowel or bladder changes.    Physical exam: Well-nourished, well kept.  Good perfusion to the extremities ×4.  Radial and dorsalis pedis pulses 2+.  Capillary refill to all 4 digits brisk.  No distal edema x 4.  No lymphangitis or lymphadenopathy in the examined extremities.  Gait normal.  Can walk on heels and toes.  Thoracic spine is nontender.  Examination of the back shows tenderness in the paraspinous musculature.  There is decreased range of motion in all directions due to guarding/muscle spasms and pain at extremes.  There is good strength and no instability.  Examination of the lower extremities reveals no point tenderness, swelling, or deformity.  Range of motion of the hips, knees, and ankles are full without crepitance, instability, or exacerbation of pain except the patient has significant decreased range of motion of his right hip and range of motion of his right hip reproduces all of his extremity pain.  Strength is 5/5 throughout except there may be some mild weakness of hip flexion on the right versus the left.  No redness, abrasions, or lesions on all 4 extremities, head and neck, or trunk.  Gross sensation intact in the extremities ×4.  Deep tendon reflexes 2+ and symmetric bilaterally.  Luis A and  clonus were negative.  Straight leg raise negative.  Affect normal.  Alert and oriented ×3.  Coordination normal.    X-rays and MRIs were reviewed.  The patient has a right hip pinning on the right and a femoral yannick on the left.  He has severe degenerative changes of his right hip partially visualized.  He has degenerative changes throughout his lumbar spine and an isthmic spondylolisthesis at L5-S1.  The MRI shows some moderate to severe foraminal stenosis on the right at L5-S1 and no real stenosis anywhere else.    Assessment/plan: Patient with above described imaging studies.  I think his right hip is the main cause of his hip groin and thigh pain.  I think his back pain is coming from degenerative changes in his isthmic spondylolisthesis.  An operation on his back would be a TLIF at L5-S1.  However, I think his hip is his predominant problem.  I would recommend having his hip replaced first before we would address his spine as I am not convinced a spine surgery is going to take all of his back pain away and his extremity pain is likely coming from his hip.  I discussed and considered this with the patient.  We are going to get him into one of my partners to discuss screw removal and right hip replacement.  Once that is been completed and he is recovered he can come back and see me if he still has any issues or problems with his back.  Were also can get him into physical therapy for his back pain.

## 2024-11-18 ENCOUNTER — APPOINTMENT (OUTPATIENT)
Dept: PRIMARY CARE | Facility: CLINIC | Age: 49
End: 2024-11-18
Payer: COMMERCIAL

## 2024-11-18 DIAGNOSIS — M54.50 ACUTE BILATERAL LOW BACK PAIN WITHOUT SCIATICA: ICD-10-CM

## 2024-11-18 DIAGNOSIS — Z51.81 THERAPEUTIC DRUG MONITORING: ICD-10-CM

## 2024-11-18 PROCEDURE — 80354 DRUG SCREENING FENTANYL: CPT

## 2024-11-18 PROCEDURE — 80346 BENZODIAZEPINES1-12: CPT

## 2024-11-18 PROCEDURE — 80373 DRUG SCREENING TRAMADOL: CPT

## 2024-11-18 PROCEDURE — 80368 SEDATIVE HYPNOTICS: CPT

## 2024-11-18 PROCEDURE — 80361 OPIATES 1 OR MORE: CPT

## 2024-11-18 PROCEDURE — 80307 DRUG TEST PRSMV CHEM ANLYZR: CPT

## 2024-11-18 PROCEDURE — 80358 DRUG SCREENING METHADONE: CPT

## 2024-11-18 PROCEDURE — 82570 ASSAY OF URINE CREATININE: CPT

## 2024-11-18 PROCEDURE — 80365 DRUG SCREENING OXYCODONE: CPT

## 2024-11-18 RX ORDER — OXYCODONE AND ACETAMINOPHEN 5; 325 MG/1; MG/1
1 TABLET ORAL EVERY 4 HOURS PRN
Qty: 21 TABLET | Refills: 0 | Status: SHIPPED | OUTPATIENT
Start: 2024-11-18

## 2024-11-18 NOTE — TELEPHONE ENCOUNTER
Last seen 9/24/24  Medication pended  UDS CONTRACT 11/18/24    PLEASE ADVISE IF PATIENT NEEDS APPT..... THIS WILL BE THE 3RD FILL THIS MONTH PER OARRS

## 2024-11-18 NOTE — PROGRESS NOTES
Patient presents in the office today for a UDS and to sign a contract. Patient is currently taking Percocet and last took on 11/17/24.

## 2024-11-19 LAB
AMPHETAMINES UR QL SCN: NORMAL
BARBITURATES UR QL SCN: NORMAL
BZE UR QL SCN: NORMAL
CANNABINOIDS UR QL SCN: NORMAL
CREAT UR-MCNC: 174.4 MG/DL (ref 20–370)
PCP UR QL SCN: NORMAL

## 2024-11-21 LAB
1OH-MIDAZOLAM UR CFM-MCNC: <25 NG/ML
6MAM UR CFM-MCNC: <25 NG/ML
7AMINOCLONAZEPAM UR CFM-MCNC: <25 NG/ML
A-OH ALPRAZ UR CFM-MCNC: <25 NG/ML
ALPRAZ UR CFM-MCNC: <25 NG/ML
CHLORDIAZEP UR CFM-MCNC: <25 NG/ML
CLONAZEPAM UR CFM-MCNC: <25 NG/ML
CODEINE UR CFM-MCNC: <50 NG/ML
DIAZEPAM UR CFM-MCNC: <25 NG/ML
EDDP UR CFM-MCNC: <25 NG/ML
FENTANYL UR CFM-MCNC: <2.5 NG/ML
HYDROCODONE CTO UR CFM-MCNC: <25 NG/ML
HYDROMORPHONE UR CFM-MCNC: <25 NG/ML
LORAZEPAM UR CFM-MCNC: <25 NG/ML
METHADONE UR CFM-MCNC: <25 NG/ML
MIDAZOLAM UR CFM-MCNC: <25 NG/ML
MORPHINE UR CFM-MCNC: <50 NG/ML
NORDIAZEPAM UR CFM-MCNC: <25 NG/ML
NORFENTANYL UR CFM-MCNC: <2.5 NG/ML
NORHYDROCODONE UR CFM-MCNC: <25 NG/ML
NOROXYCODONE UR CFM-MCNC: 648 NG/ML
NORTRAMADOL UR-MCNC: <50 NG/ML
OXAZEPAM UR CFM-MCNC: <25 NG/ML
OXYCODONE UR CFM-MCNC: 345 NG/ML
OXYMORPHONE UR CFM-MCNC: 1484 NG/ML
TEMAZEPAM UR CFM-MCNC: <25 NG/ML
TRAMADOL UR CFM-MCNC: <50 NG/ML
ZOLPIDEM UR CFM-MCNC: <25 NG/ML
ZOLPIDEM UR-MCNC: <25 NG/ML

## 2024-12-06 DIAGNOSIS — M54.50 ACUTE BILATERAL LOW BACK PAIN WITHOUT SCIATICA: ICD-10-CM

## 2024-12-06 NOTE — TELEPHONE ENCOUNTER
MEDICATION PENDED  Rx Controlled Refill Request Telephone Encounter    Name: Yan Monroy  :  1975      oxyCODONE-acetaminophen (Percocet) 5-325 mg tablet [[id number]]    Order Details    Dose: 1 tablet Route: oral Frequency: Every 4 hours PRN for severe pain (7 - 10)   Dispense Quantity: 21 tablet Refills: 0          Sig: Take 1 tablet by mouth every 4 hours if needed for severe pain (7 - 10).           ALLERGIES:    NKDA    LAST DRUG SCREEN/MED CONTRACT:     2024    Specific Pharmacy location:    [facility]    Date of last appointment:    2024  Date of next appointment:    NONE    Best number to reach patient:    [phone]

## 2024-12-09 RX ORDER — OXYCODONE AND ACETAMINOPHEN 5; 325 MG/1; MG/1
1 TABLET ORAL EVERY 4 HOURS PRN
Qty: 21 TABLET | Refills: 0 | Status: SHIPPED | OUTPATIENT
Start: 2024-12-09

## 2024-12-10 ENCOUNTER — OFFICE VISIT (OUTPATIENT)
Dept: ORTHOPEDIC SURGERY | Facility: CLINIC | Age: 49
End: 2024-12-10
Payer: COMMERCIAL

## 2024-12-10 ENCOUNTER — HOSPITAL ENCOUNTER (OUTPATIENT)
Dept: RADIOLOGY | Facility: CLINIC | Age: 49
Discharge: HOME | End: 2024-12-10
Payer: COMMERCIAL

## 2024-12-10 DIAGNOSIS — M16.11 PRIMARY OSTEOARTHRITIS OF RIGHT HIP: Primary | ICD-10-CM

## 2024-12-10 DIAGNOSIS — M79.604 RIGHT LEG PAIN: ICD-10-CM

## 2024-12-10 DIAGNOSIS — M25.551 PAIN OF RIGHT HIP: ICD-10-CM

## 2024-12-10 PROCEDURE — 72170 X-RAY EXAM OF PELVIS: CPT

## 2024-12-10 PROCEDURE — 99214 OFFICE O/P EST MOD 30 MIN: CPT | Performed by: ORTHOPAEDIC SURGERY

## 2024-12-10 PROCEDURE — 73552 X-RAY EXAM OF FEMUR 2/>: CPT | Mod: RT

## 2024-12-10 PROCEDURE — 73552 X-RAY EXAM OF FEMUR 2/>: CPT | Mod: RIGHT SIDE | Performed by: ORTHOPAEDIC SURGERY

## 2024-12-10 PROCEDURE — 72170 X-RAY EXAM OF PELVIS: CPT | Performed by: ORTHOPAEDIC SURGERY

## 2024-12-11 NOTE — PROGRESS NOTES
New patient to me 49-year-old gentleman complicated history of right lower extremity he states that over 20 years ago he had a complex fracture of his right lower extremity he was treated at [facility] he states he has been having increasing pain in his hip and knee over the last year or so ended up seeing Dr. Barron for the groin pain but it turns out that it was felt to be more related to his hip and his leg and he has been referred for consultation he is having severe impairment of bodily function related to his right hip and knee.    Location of pain: In the groin and down the right leg  Quality of pain: Chronic pain for many years getting progressively worse  Modifying factors: Worse with weightbearing better with rest  Associated signs and symptoms: Denies any numbness or tingling  Previous treatment: History of a femoral neck and femoral shaft fracture treated at [facility] many years ago.  He states he was not compliant with his recovery and has developed significant rotational deformity in the right lower extremity        The patient's past medical history, family history, social history, and review of systems were documented on the patient's medical intake form.  The medical intake form was reviewed and scanned into the electronic medical record for future use.  History is otherwise negative except as stated in the HPI.    Physical exam    General: Alert and oriented to place, person, and time.  No acute distress and breathing comfortably; pleasant and cooperative with the examination.  HEENT: Head is normocephalic and atraumatic.  Neck: Supple, no visible swelling.  Cardiovascular: Good perfusion to the affected extremity.  Lungs: No audible wheezing or labored breathing.  Abdomen: Nondistended  HEME/Lymph : No visible abnormalities bilateral lower extremity    Extremity:  The affected hip was examined and inspected and was tender to touch along the groin and lateral bursal area.  The hip joint occasionally  displayed catching, locking, and mechanical symptoms.  The skin was intact without breakdown or open wounds.  There was some mild crepitus seen with hip internal and external range of motion without evidence of instability.  Inspection of the low back showed normal curvature and integrity of the skin.  Strength and stability of the low back muscles and ligaments were within normal limits.  There was a negative straight leg raise test with no foot drop, numbness, or tingling in both lower extremities.  Sensation, reflexes, and pulses in the foot and ankle are preserved.  There was no obvious effusion.  Range of motion showed flexion and internal and external rotation were all limited with pain.  The patient had the ability to bear weight, but with discomfort.  The patient's gait was antalgic secondary to the discomfort.  Patient has significant rotational deformity of his right lower extremity with external rotation of his foot of almost 40 degrees.    Diagnostics:      XR lumbar spine 4+ views w flexion extension    Result Date: 11/15/2024  Interpreted By:  Amor Barron, STUDY: XR LUMBAR SPINE 4+ VIEWS WITH FLEXION EXTENSION; 11/15/2024 4:05 pm   INDICATION: Signs/Symptoms:low back pain.   ACCESSION NUMBER(S): [id number]   ORDERING CLINICIAN: AMOR BARRON   FINDINGS: AP lateral flexion-extension x-rays of the lumbar spine show moderate to severe degenerative changes at all levels most notable at L5-S1 where there is significant disc height loss. There is an isthmic spondylolisthesis at L5-S1. There is no spondylolysis or spondylolisthesis at any other levels. There is no fractures. Lumbar lordosis is maintained. Range of motion with flexion extension is decreased with extension. There is a scoliosis of less than 5 degrees. Pedicles are visualized at all levels. Bony pelvis and hips are partially visualized on show hip pinning on the right side and a femoral nail on the left side with moderate to severe  right-sided hip degenerative changes.     Signed by: Amor Barron 11/15/2024 4:08 PM Dictation workstation:   [id number]         Procedures  [none   ]    Assessment:  Right hip arthritis with malunion right femur fracture    Treatment plan:  1.  The natural history of the condition and its associated treatment alternatives including surgical and nonsurgical options were discussed with the patient at length.  2.  Patient with significant impairment of bodily function related to his right hip and knee.  He has a very complex problem with his right lower extremity he is got significant arthritic changes in his hip.  He has 3 cannulated screws in his hip.  He also apparently had a femoral shaft fracture that was treated with a retrograde nail this is gone on to heal but is healed with significant rotational deformity of his right lower extremity and the nail appears to be broken along the proximal segment.  If he were to proceed with hip replacement he would need an extensive operation and somehow that nail would need to come out for the femoral stem I explained to him this is a very complex operation and likely would require referral to a trauma surgeon/orthopedic joint replacement surgeon he states he is not really interested in any surgical intervention at this time anyways because he cannot take any extensive time off of work so we are to go ahead and proceed with an intra-articular injection of the right hip to see if we can get him some temporary relief and then see me back afterwards to assess results.  We did discuss surgical nonsurgical options at length today and he is having significant impairment we will see how he responds to the injection.  3. [   ]  4.  All of the patient's questions were answered.    Orders Placed This Encounter    XR femur right 2+ views    FL guided aspiration or injection large joint right       This note was prepared using voice recognition software.  The details of this note  are correct and have been reviewed, and corrected to the best of my ability.  Some grammatical areas may persist related to the Dragon software    Franky Mcintosh MD  Senior Attending Physician  [facility]  [facility] [place]    [phone]

## 2024-12-20 DIAGNOSIS — M54.50 ACUTE BILATERAL LOW BACK PAIN WITHOUT SCIATICA: ICD-10-CM

## 2024-12-20 RX ORDER — OXYCODONE AND ACETAMINOPHEN 5; 325 MG/1; MG/1
1 TABLET ORAL EVERY 4 HOURS PRN
Qty: 21 TABLET | Refills: 0 | Status: SHIPPED | OUTPATIENT
Start: 2024-12-20

## 2024-12-20 NOTE — TELEPHONE ENCOUNTER
MEDICATION PENDED  Rx Controlled Refill Request Telephone Encounter    Name: Yan Monroy  :  1975    Medication Name:   PERCOCET  Dose (Optional):   5 - 325 MG  Quantity (Optional):   21  Directions (Optional):   1 TABLET EVERY 4 HOURS PRN FOR PAIN    ALLERGIES:    ON FILE    LAST DRUG SCREEN/MED CONTRACT:     24    Specific [facility] [place]    Date of last appointment:    24  Date of next appointment:    NONE    Best number to reach patient:    [phone]

## 2024-12-30 DIAGNOSIS — M54.50 ACUTE BILATERAL LOW BACK PAIN WITHOUT SCIATICA: ICD-10-CM

## 2024-12-30 RX ORDER — OXYCODONE AND ACETAMINOPHEN 5; 325 MG/1; MG/1
1 TABLET ORAL EVERY 4 HOURS PRN
Qty: 21 TABLET | Refills: 0 | Status: SHIPPED | OUTPATIENT
Start: 2024-12-30

## 2024-12-30 NOTE — TELEPHONE ENCOUNTER
PLEASE ADVISE ON QUANTITY--- ONLY HAVE BEEN SENDING OVER A WEEK SUPPLY AT A TIME--MEDICATION PENDED    Rx Refill Request Telephone Encounter  AUDIE MEJIA 24  Name:  Yan Monroy  :  [id number]  oxyCODONE-acetaminophen (Percocet) 5-325 mg tablet [[id number]]    Order Details  Dose: 1 tablet Route: oral Frequency: Every 4 hours PRN for severe pain (7 - 10)   Dispense Quantity: 21 tablet Refills: 0          Sig: Take 1 tablet by mouth every 4 hours if needed for severe pain (7 - 10).     Specific Pharmacy location:    [facility] - [place] [address] [facility]  [address] [facility] [address]  Phone: [phone]  Fax: [phone]  YVES #: --     Date of last appointment:  24  Date of next appointment:  NA  Best number to reach patient:  [phone]

## 2025-01-09 DIAGNOSIS — N52.8 OTHER MALE ERECTILE DYSFUNCTION: ICD-10-CM

## 2025-01-09 DIAGNOSIS — M54.50 ACUTE BILATERAL LOW BACK PAIN WITHOUT SCIATICA: ICD-10-CM

## 2025-01-09 RX ORDER — SILDENAFIL 100 MG/1
100 TABLET, FILM COATED ORAL DAILY PRN
Qty: 30 TABLET | Refills: 1 | Status: SHIPPED | OUTPATIENT
Start: 2025-01-09

## 2025-01-09 RX ORDER — OXYCODONE AND ACETAMINOPHEN 5; 325 MG/1; MG/1
1 TABLET ORAL EVERY 4 HOURS PRN
Qty: 15 TABLET | Refills: 0 | Status: SHIPPED | OUTPATIENT
Start: 2025-01-09

## 2025-01-09 NOTE — TELEPHONE ENCOUNTER
Rx Refill Request Telephone Encounter  AUDIE MEJIA 24  Name:  Yan Monroy  :  [id number]  oxyCODONE-acetaminophen (Percocet) 5-325 mg tablet [[id number]]    Order Details  Dose: 1 tablet Route: oral Frequency: Every 4 hours PRN for severe pain (7 - 10)   Dispense Quantity: 21 tablet Refills: 0          Sig: Take 1 tablet by mouth every 4 hours if needed for severe pain (7 - 10).     Specific Pharmacy location:    [facility] [place] [id number]  Phone: [phone]  Fax: [phone]  YVES #: --     Date of last appointment:  24  Date of next appointment:  na  Best number to reach patient:  [phone]

## 2025-01-09 NOTE — TELEPHONE ENCOUNTER
Patient has not been seen since September.  He needs an appointment for this medication.  Please contact patient and assist him in scheduling appointment.   Thank you!

## 2025-01-14 ENCOUNTER — APPOINTMENT (OUTPATIENT)
Dept: PRIMARY CARE | Facility: CLINIC | Age: 50
End: 2025-01-14
Payer: COMMERCIAL

## 2025-01-14 VITALS
TEMPERATURE: 97.2 F | OXYGEN SATURATION: 98 % | WEIGHT: 230 LBS | HEART RATE: 86 BPM | DIASTOLIC BLOOD PRESSURE: 60 MMHG | BODY MASS INDEX: 34.86 KG/M2 | SYSTOLIC BLOOD PRESSURE: 124 MMHG | HEIGHT: 68 IN | RESPIRATION RATE: 18 BRPM

## 2025-01-14 DIAGNOSIS — G89.29 CHRONIC BILATERAL LOW BACK PAIN WITHOUT SCIATICA: Primary | ICD-10-CM

## 2025-01-14 DIAGNOSIS — M25.551 CHRONIC HIP PAIN, RIGHT: ICD-10-CM

## 2025-01-14 DIAGNOSIS — M54.50 CHRONIC BILATERAL LOW BACK PAIN WITHOUT SCIATICA: Primary | ICD-10-CM

## 2025-01-14 DIAGNOSIS — G89.29 CHRONIC HIP PAIN, RIGHT: ICD-10-CM

## 2025-01-14 DIAGNOSIS — J06.9 UPPER RESPIRATORY TRACT INFECTION, UNSPECIFIED TYPE: ICD-10-CM

## 2025-01-14 PROCEDURE — 99214 OFFICE O/P EST MOD 30 MIN: CPT | Performed by: NURSE PRACTITIONER

## 2025-01-14 PROCEDURE — 3008F BODY MASS INDEX DOCD: CPT | Performed by: NURSE PRACTITIONER

## 2025-01-14 RX ORDER — AZITHROMYCIN 250 MG/1
250 TABLET, FILM COATED ORAL DAILY
Qty: 6 TABLET | Refills: 0 | Status: SHIPPED | OUTPATIENT
Start: 2025-01-14 | End: 2025-01-19

## 2025-01-14 RX ORDER — OXYCODONE AND ACETAMINOPHEN 5; 325 MG/1; MG/1
1 TABLET ORAL 3 TIMES DAILY PRN
Qty: 90 TABLET | Refills: 0 | Status: SHIPPED | OUTPATIENT
Start: 2025-01-14

## 2025-01-14 RX ORDER — CYCLOBENZAPRINE HCL 5 MG
5 TABLET ORAL 3 TIMES DAILY PRN
Qty: 30 TABLET | Refills: 2 | Status: SHIPPED | OUTPATIENT
Start: 2025-01-14 | End: 2025-09-11
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
## 2025-01-21 ASSESSMENT — ENCOUNTER SYMPTOMS
DIARRHEA: 0
FATIGUE: 0
APPETITE CHANGE: 0
HEADACHES: 0
CONSTIPATION: 0
WHEEZING: 0
CHILLS: 0
CHEST TIGHTNESS: 0
COUGH: 1
ABDOMINAL PAIN: 0
FACIAL ASYMMETRY: 0
BACK PAIN: 1
ARTHRALGIAS: 1
DIAPHORESIS: 0
CONFUSION: 0
FEVER: 0
PALPITATIONS: 0
NERVOUS/ANXIOUS: 0
DIZZINESS: 0
MYALGIAS: 0
UNEXPECTED WEIGHT CHANGE: 0
SHORTNESS OF BREATH: 0
WOUND: 0

## 2025-01-30 ENCOUNTER — APPOINTMENT (OUTPATIENT)
Dept: PRIMARY CARE | Facility: CLINIC | Age: 50
End: 2025-01-30
Payer: COMMERCIAL

## 2025-02-14 DIAGNOSIS — M54.50 CHRONIC BILATERAL LOW BACK PAIN WITHOUT SCIATICA: ICD-10-CM

## 2025-02-14 DIAGNOSIS — M25.551 CHRONIC HIP PAIN, RIGHT: ICD-10-CM

## 2025-02-14 DIAGNOSIS — G89.29 CHRONIC BILATERAL LOW BACK PAIN WITHOUT SCIATICA: ICD-10-CM

## 2025-02-14 DIAGNOSIS — G89.29 CHRONIC HIP PAIN, RIGHT: ICD-10-CM

## 2025-02-14 RX ORDER — OXYCODONE AND ACETAMINOPHEN 5; 325 MG/1; MG/1
1 TABLET ORAL 3 TIMES DAILY PRN
Qty: 90 TABLET | Refills: 0 | Status: SHIPPED | OUTPATIENT
Start: 2025-02-14

## 2025-02-14 NOTE — TELEPHONE ENCOUNTER
MEDICATION PENDED  Rx Controlled Refill Request Telephone Encounter    Name: Yan Monroy  :  1975    Medication Name:   PERCOCET  Dose (Optional):   5 - 325 MG  Quantity (Optional):   90  Directions (Optional):   1 TABLET THREE TIMES A DAY    ALLERGIES:    ON FILE    LAST DRUG SCREEN/MED CONTRACT:     24    Specific Pharmacy location:    [facility]    Date of last appointment:    25  Date of next appointment:    25    Best number to reach patient:    [phone]

## 2025-03-17 DIAGNOSIS — G89.29 CHRONIC BILATERAL LOW BACK PAIN WITHOUT SCIATICA: ICD-10-CM

## 2025-03-17 DIAGNOSIS — M25.551 CHRONIC HIP PAIN, RIGHT: ICD-10-CM

## 2025-03-17 DIAGNOSIS — G89.29 CHRONIC HIP PAIN, RIGHT: ICD-10-CM

## 2025-03-17 DIAGNOSIS — M54.50 CHRONIC BILATERAL LOW BACK PAIN WITHOUT SCIATICA: ICD-10-CM

## 2025-03-17 RX ORDER — OXYCODONE AND ACETAMINOPHEN 5; 325 MG/1; MG/1
1 TABLET ORAL 3 TIMES DAILY PRN
Qty: 90 TABLET | Refills: 0 | Status: SHIPPED | OUTPATIENT
Start: 2025-03-17

## 2025-03-17 NOTE — TELEPHONE ENCOUNTER
MEDICATION PENDED  Rx Refill Request Telephone Encounter    UDS/ Contract 2024     Name:  Yan Monroy  :  [id number]  oxyCODONE-acetaminophen (Percocet) 5-325 mg tablet [[id number]]    Order Details    Dose: 1 tablet Route: oral Frequency: 3 times daily PRN for severe pain (7 - 10)   Dispense Quantity: 90 tablet Refills: 0          Sig: Take 1 tablet by mouth 3 times a day as needed for severe pain (7 - 10).           Specific Pharmacy location:    [facility] [address] [place] [address] [facility] [address]  Phone: [phone]  Fax: [phone]  YVES #:     Date of last appointment:  25  Date of next appointment:  25  Best number to reach patient:  [phone]

## 2025-04-15 NOTE — PROGRESS NOTES
Subjective   Patient ID: Yan Monroy is a 49 y.o. male who presents for No chief complaint on file..  HPI    Patient presents today for a follow-up on back pain. Is taking percocet. States he has no concerns with this medication. Rates the pain a 6-7/10 over the past 7 days. Reports that the medication gives 50% pain control/relief. OARRS reviewed today. Controlled substance contract signed 11/18/24. Last took it today.      Review of Systems  Constitutional:  no chills, no fever and no night sweats.  Eyes: no blurred vision and no eyesight problems.  ENT: no hearing loss, no nasal congestion, no hoarseness and no sore throat.  Neck: no mass (es) and no swelling.  Cardiovascular: no chest pain, no intermittent leg claudication, no lower extremity edema, no palpitation and no syncope.  Respiratory: no cough, no shortness of breath during exertion, no shortness of breath at rest and no wheezing.  Gastrointestinal: no abdominal pain, no blood in stools, no constipation, no diarrhea, no melena, no nausea, no rectal pain and no vomiting.  Genitourinary: no dysuria, no change in urinary frequency, no urinary hesitancy and no feelings of urinary urgency.  Musculoskeletal: no arthralgias, no back pain and no myalgias.  Integumentary: no new skin lesions and no rashes.  Neurological: no difficulty walking, no headache, no limb weakness, no numbness and no tingling.  Psychiatric/Behavioral: no anxiety, no depression, no anhedonia and no substance use disorders.  Endocrine: no recent weight gain and no recent weight loss.  Hematologic/Lymphatic: no tendency for easy bruising and no swollen glands    Objective   Physical ExamPatient with his wife in for follow-up chronic back pain also hyperlipidemia he was due to get blood drawn today with ran out of the medication and never called to get it refilled as he did not know he was supposed to continue on it.  Will refill the medication and have him stay on it continuously and in  3 months check a lipid panel.  Increased pain and tenderness in the low back today but did a lot more physical work than usual he at work today.  Denies any paresthesias into the legs.  Decreased range of motion flexion extension due to muscle stiffness and pain currently.  Lungs are clear cardiac and abdominal exams are benign he denies chest pain or shortness of breath with exertion.    There were no vitals taken for this visit.    Lab Results   Component Value Date    WBC 12.4 (H) 08/19/2024    HGB 15.2 08/19/2024    HCT 45.7 08/19/2024    MCV 91 08/19/2024     08/19/2024       Assessment/Plan plan routine recheck 3 months  Problem List Items Addressed This Visit    None

## 2025-04-17 ENCOUNTER — APPOINTMENT (OUTPATIENT)
Dept: PRIMARY CARE | Facility: CLINIC | Age: 50
End: 2025-04-17
Payer: COMMERCIAL

## 2025-04-17 VITALS
HEART RATE: 87 BPM | SYSTOLIC BLOOD PRESSURE: 128 MMHG | BODY MASS INDEX: 35.61 KG/M2 | HEIGHT: 68 IN | OXYGEN SATURATION: 97 % | WEIGHT: 235 LBS | DIASTOLIC BLOOD PRESSURE: 60 MMHG | TEMPERATURE: 97.5 F

## 2025-04-17 DIAGNOSIS — J45.20 MILD INTERMITTENT ASTHMA WITHOUT COMPLICATION (HHS-HCC): ICD-10-CM

## 2025-04-17 DIAGNOSIS — M54.50 CHRONIC BILATERAL LOW BACK PAIN WITHOUT SCIATICA: ICD-10-CM

## 2025-04-17 DIAGNOSIS — G89.29 CHRONIC HIP PAIN, RIGHT: ICD-10-CM

## 2025-04-17 DIAGNOSIS — G89.29 CHRONIC BILATERAL LOW BACK PAIN WITHOUT SCIATICA: ICD-10-CM

## 2025-04-17 DIAGNOSIS — E78.2 MIXED HYPERLIPIDEMIA: ICD-10-CM

## 2025-04-17 DIAGNOSIS — N52.8 OTHER MALE ERECTILE DYSFUNCTION: ICD-10-CM

## 2025-04-17 DIAGNOSIS — M25.551 CHRONIC HIP PAIN, RIGHT: ICD-10-CM

## 2025-04-17 PROCEDURE — 3008F BODY MASS INDEX DOCD: CPT | Performed by: FAMILY MEDICINE

## 2025-04-17 PROCEDURE — 99213 OFFICE O/P EST LOW 20 MIN: CPT | Performed by: FAMILY MEDICINE

## 2025-04-17 RX ORDER — ROSUVASTATIN CALCIUM 20 MG/1
20 TABLET, COATED ORAL DAILY
Qty: 30 TABLET | Refills: 1 | Status: SHIPPED | OUTPATIENT
Start: 2025-04-17 | End: 2025-07-16

## 2025-04-17 RX ORDER — OXYCODONE AND ACETAMINOPHEN 5; 325 MG/1; MG/1
1 TABLET ORAL 3 TIMES DAILY PRN
Qty: 90 TABLET | Refills: 0 | Status: SHIPPED | OUTPATIENT
Start: 2025-04-17

## 2025-04-17 RX ORDER — SILDENAFIL 100 MG/1
100 TABLET, FILM COATED ORAL DAILY PRN
Qty: 30 TABLET | Refills: 1 | Status: SHIPPED | OUTPATIENT
Start: 2025-04-17

## 2025-05-19 DIAGNOSIS — M25.551 CHRONIC HIP PAIN, RIGHT: ICD-10-CM

## 2025-05-19 DIAGNOSIS — G89.29 CHRONIC BILATERAL LOW BACK PAIN WITHOUT SCIATICA: ICD-10-CM

## 2025-05-19 DIAGNOSIS — G89.29 CHRONIC HIP PAIN, RIGHT: ICD-10-CM

## 2025-05-19 DIAGNOSIS — M54.50 CHRONIC BILATERAL LOW BACK PAIN WITHOUT SCIATICA: ICD-10-CM

## 2025-05-19 RX ORDER — OXYCODONE AND ACETAMINOPHEN 5; 325 MG/1; MG/1
1 TABLET ORAL 3 TIMES DAILY PRN
Qty: 90 TABLET | Refills: 0 | Status: SHIPPED | OUTPATIENT
Start: 2025-05-19

## 2025-05-19 NOTE — TELEPHONE ENCOUNTER
Rx Controlled Refill Request Telephone Encounter    Name: Yan Monroy  :  1975    Medication Name:   oxyCODONE-acetaminophen (Percocet) 5-325 mg tablet [[id number]]    Order Details  Dose: 1 tablet Route: oral Frequency: 3 times daily PRN for severe pain (7 - 10)   Dispense Quantity: 90 tablet (30 day supply) Refills: 0    Duration: -- Dispense As Written: No          Sig: Take 1 tablet by mouth 3 times a day as needed for severe pain (7 - 10).     ALLERGIES:    no known     LAST DRUG SCREEN/MED CONTRACT:     24    Specific Pharmacy location:      [facility] [address] [place] [address]     Date of last appointment:    25  Date of next appointment:    25    Best number to reach patient:    [phone]                                  PT IS COMPLETELY OUT OF MEDICINE!!!!

## 2025-06-18 DIAGNOSIS — G89.29 CHRONIC HIP PAIN, RIGHT: ICD-10-CM

## 2025-06-18 DIAGNOSIS — G89.29 CHRONIC BILATERAL LOW BACK PAIN WITHOUT SCIATICA: ICD-10-CM

## 2025-06-18 DIAGNOSIS — M54.50 CHRONIC BILATERAL LOW BACK PAIN WITHOUT SCIATICA: ICD-10-CM

## 2025-06-18 DIAGNOSIS — E78.2 MIXED HYPERLIPIDEMIA: ICD-10-CM

## 2025-06-18 DIAGNOSIS — M25.551 CHRONIC HIP PAIN, RIGHT: ICD-10-CM

## 2025-06-18 RX ORDER — OXYCODONE AND ACETAMINOPHEN 5; 325 MG/1; MG/1
1 TABLET ORAL 3 TIMES DAILY PRN
Qty: 90 TABLET | Refills: 0 | Status: SHIPPED | OUTPATIENT
Start: 2025-06-18

## 2025-06-18 RX ORDER — ROSUVASTATIN CALCIUM 20 MG/1
20 TABLET, COATED ORAL DAILY
Qty: 30 TABLET | Refills: 1 | Status: SHIPPED | OUTPATIENT
Start: 2025-06-18 | End: 2025-09-16

## 2025-06-18 RX ORDER — CYCLOBENZAPRINE HCL 5 MG
5 TABLET ORAL 3 TIMES DAILY PRN
Qty: 30 TABLET | Refills: 2 | Status: SHIPPED | OUTPATIENT
Start: 2025-06-18 | End: 2026-02-13

## 2025-06-18 NOTE — TELEPHONE ENCOUNTER
Rx Controlled Refill Request Telephone Encounter    Name: Yan Monroy  :  1975    oxyCODONE-acetaminophen (Percocet) 5-325 mg tablet [[id number]]    Order Details  Dose: 1 tablet Route: oral Frequency: 3 times daily PRN for severe pain (7 - 10)   Dispense Quantity: 90 tablet (30 day supply) Refills: 0    Duration: -- Dispense As Written: No          Sig: Take 1 tablet by mouth 3 times a day as needed for severe pain (7 - 10).     rosuvastatin (Crestor) 20 mg tablet [[id number]]    Order Details    Dose: 20 mg Route: oral Frequency: Daily   Dispense Quantity: 30 tablet (30 day supply) Refills: 1    Duration: 90 days Dispense As Written: No          Sig: Take 1 tablet (20 mg) by mouth once daily.           cyclobenzaprine (Flexeril) 5 mg tablet [[id number]]    Order Details  Dose: 5 mg Route: oral Frequency: 3 times daily PRN for muscle spasms   Dispense Quantity: 30 tablet (10 day supply) Refills: 2    Duration: 240 days Dispense As Written: No          Sig: Take 1 tablet (5 mg) by mouth 3 times a day as needed for muscle spasms.     ALLERGIES:    NKDA    LAST DRUG SCREEN/MED CONTRACT:     2024    Specific Pharmacy location:    [facility]    Date of last appointment:    2025  Date of next appointment:    2025    Best number to reach patient:    [phone]

## 2025-07-17 ENCOUNTER — APPOINTMENT (OUTPATIENT)
Dept: PRIMARY CARE | Facility: CLINIC | Age: 50
End: 2025-07-17
Payer: COMMERCIAL

## 2025-07-17 VITALS
HEART RATE: 100 BPM | DIASTOLIC BLOOD PRESSURE: 78 MMHG | BODY MASS INDEX: 36.37 KG/M2 | HEIGHT: 68 IN | OXYGEN SATURATION: 98 % | WEIGHT: 240 LBS | SYSTOLIC BLOOD PRESSURE: 134 MMHG

## 2025-07-17 DIAGNOSIS — M54.50 CHRONIC BILATERAL LOW BACK PAIN WITHOUT SCIATICA: ICD-10-CM

## 2025-07-17 DIAGNOSIS — N40.0 BENIGN PROSTATIC HYPERPLASIA WITHOUT LOWER URINARY TRACT SYMPTOMS: Primary | ICD-10-CM

## 2025-07-17 DIAGNOSIS — R53.83 FATIGUE, UNSPECIFIED TYPE: ICD-10-CM

## 2025-07-17 DIAGNOSIS — E78.2 MIXED HYPERLIPIDEMIA: ICD-10-CM

## 2025-07-17 DIAGNOSIS — G89.29 CHRONIC BILATERAL LOW BACK PAIN WITHOUT SCIATICA: ICD-10-CM

## 2025-07-17 DIAGNOSIS — M25.551 CHRONIC HIP PAIN, RIGHT: ICD-10-CM

## 2025-07-17 DIAGNOSIS — G89.29 CHRONIC HIP PAIN, RIGHT: ICD-10-CM

## 2025-07-17 PROCEDURE — 99213 OFFICE O/P EST LOW 20 MIN: CPT | Performed by: FAMILY MEDICINE

## 2025-07-17 PROCEDURE — 3008F BODY MASS INDEX DOCD: CPT | Performed by: FAMILY MEDICINE

## 2025-07-17 RX ORDER — ROSUVASTATIN CALCIUM 20 MG/1
20 TABLET, COATED ORAL DAILY
Qty: 30 TABLET | Refills: 5 | Status: SHIPPED | OUTPATIENT
Start: 2025-07-17 | End: 2025-08-16

## 2025-07-17 RX ORDER — OXYCODONE AND ACETAMINOPHEN 5; 325 MG/1; MG/1
1 TABLET ORAL 3 TIMES DAILY PRN
Qty: 90 TABLET | Refills: 0 | Status: SHIPPED | OUTPATIENT
Start: 2025-07-17

## 2025-07-17 ASSESSMENT — PATIENT HEALTH QUESTIONNAIRE - PHQ9
SUM OF ALL RESPONSES TO PHQ9 QUESTIONS 1 AND 2: 0
1. LITTLE INTEREST OR PLEASURE IN DOING THINGS: NOT AT ALL
2. FEELING DOWN, DEPRESSED OR HOPELESS: NOT AT ALL

## 2025-07-17 NOTE — PROGRESS NOTES
Subjective   Patient ID: Yan Monroy is a 50 y.o. male who presents for No chief complaint on file..  HPI    Patient presents for back pain  Is currently taking percocet  Rates the pain a 7/10 over the past 7 days   Reports that the medication gives 60% pain control/relief   [place]ARRS reviewed today  CSA 11/18/24  Last took today      Review of Systems  Constitutional:  no chills, no fever and no night sweats.  Eyes: no blurred vision and no eyesight problems.  ENT: no hearing loss, no nasal congestion, no hoarseness and no sore throat.  Neck: no mass (es) and no swelling.  Cardiovascular: no chest pain, no intermittent leg claudication, no lower extremity edema, no palpitation and no syncope.  Respiratory: no cough, no shortness of breath during exertion, no shortness of breath at rest and no wheezing.  Gastrointestinal: no abdominal pain, no blood in stools, no constipation, no diarrhea, no melena, no nausea, no rectal pain and no vomiting.  Genitourinary: no dysuria, no change in urinary frequency, no urinary hesitancy and no feelings of urinary urgency.  Musculoskeletal: no arthralgias, no back pain and no myalgias.  Integumentary: no new skin lesions and no rashes.  Neurological: no difficulty walking, no headache, no limb weakness, no numbness and no tingling.  Psychiatric/Behavioral: no anxiety, no depression, no anhedonia and no substance use disorders.  Endocrine: no recent weight gain and no recent weight loss.  Hematologic/Lymphatic: no tendency for easy bruising and no swollen glands    Objective   Physical Exam  Patient here for with his wife and for follow-up chronic back pain needs medication refill also hyperlipidemia chronic problem stable lungs clear cardiac and abdominal exams are benign.  Physical exam today's office visit constitutional alert and oriented x3.    Head is atraumatic HEENT is within normal limits.    Neck supple no masses full range of motion.    Thyroid is normal in size no  thyromegaly there is no carotid bruits.    Pulmonary exam shows clear to auscultation no respiratory distress.    Cardiovascular shows no murmur rub or gallop.  Regular rate and rhythm.    Abdominal exam soft nontender no hepatosplenomegaly or masses normal bowel sounds no rebound no guarding.    Musculoskeletal exam no joint pain no muscle pain full range of motion.    Psych exam normal mood and affect.    Dermatologic exam no skin lesions no rash no blemishes.    Neuro exam is no focal deficits.  Normal exam.    Extremities no edema normal pulses normal capillary refill.    There were no vitals taken for this visit.    Lab Results   Component Value Date    WBC 12.4 (H) 08/19/2024    HGB 15.2 08/19/2024    HCT 45.7 08/19/2024    MCV 91 08/19/2024     08/19/2024       Assessment/Plan plan continue current treatment refill medication routine recheck in 3 months.  He is due for lab work but because of the PSA recommend he wait till August 20 or after to get his labs drawn.  Problem List Items Addressed This Visit    None

## 2025-08-18 DIAGNOSIS — G89.29 CHRONIC HIP PAIN, RIGHT: ICD-10-CM

## 2025-08-18 DIAGNOSIS — M25.551 CHRONIC HIP PAIN, RIGHT: ICD-10-CM

## 2025-08-18 DIAGNOSIS — M54.50 CHRONIC BILATERAL LOW BACK PAIN WITHOUT SCIATICA: ICD-10-CM

## 2025-08-18 DIAGNOSIS — G89.29 CHRONIC BILATERAL LOW BACK PAIN WITHOUT SCIATICA: ICD-10-CM

## 2025-08-18 RX ORDER — OXYCODONE AND ACETAMINOPHEN 5; 325 MG/1; MG/1
1 TABLET ORAL 3 TIMES DAILY PRN
Qty: 90 TABLET | Refills: 0 | Status: SHIPPED | OUTPATIENT
Start: 2025-08-18